# Patient Record
Sex: MALE | Race: BLACK OR AFRICAN AMERICAN | NOT HISPANIC OR LATINO | Employment: OTHER | URBAN - METROPOLITAN AREA
[De-identification: names, ages, dates, MRNs, and addresses within clinical notes are randomized per-mention and may not be internally consistent; named-entity substitution may affect disease eponyms.]

---

## 2019-06-13 ENCOUNTER — APPOINTMENT (OUTPATIENT)
Dept: GENERAL RADIOLOGY | Facility: HOSPITAL | Age: 64
End: 2019-06-13

## 2019-06-13 ENCOUNTER — HOSPITAL ENCOUNTER (EMERGENCY)
Facility: HOSPITAL | Age: 64
Discharge: HOME | End: 2019-06-13
Attending: EMERGENCY MEDICINE | Admitting: EMERGENCY MEDICINE

## 2019-06-13 VITALS
OXYGEN SATURATION: 99 % | WEIGHT: 245 LBS | RESPIRATION RATE: 17 BRPM | DIASTOLIC BLOOD PRESSURE: 89 MMHG | BODY MASS INDEX: 32.47 KG/M2 | SYSTOLIC BLOOD PRESSURE: 143 MMHG | HEART RATE: 73 BPM | HEIGHT: 73 IN | TEMPERATURE: 98.6 F

## 2019-06-13 DIAGNOSIS — M54.31 RIGHT SIDED SCIATICA: Primary | ICD-10-CM

## 2019-06-13 PROCEDURE — 99283 EMERGENCY DEPT VISIT LOW MDM: CPT

## 2019-06-13 PROCEDURE — 25010000002 HYDROMORPHONE 1 MG/ML SOLUTION: Performed by: EMERGENCY MEDICINE

## 2019-06-13 PROCEDURE — 72110 X-RAY EXAM L-2 SPINE 4/>VWS: CPT

## 2019-06-13 PROCEDURE — 96372 THER/PROPH/DIAG INJ SC/IM: CPT

## 2019-06-13 RX ORDER — ALLOPURINOL 300 MG/1
300 TABLET ORAL DAILY
COMMUNITY

## 2019-06-13 RX ORDER — METHYLPREDNISOLONE 4 MG/1
TABLET ORAL
Qty: 21 EACH | Refills: 0 | Status: SHIPPED | OUTPATIENT
Start: 2019-06-13

## 2019-06-13 RX ORDER — HYDROCODONE BITARTRATE AND ACETAMINOPHEN 5; 325 MG/1; MG/1
1 TABLET ORAL EVERY 6 HOURS PRN
Qty: 10 TABLET | Refills: 0 | Status: SHIPPED | OUTPATIENT
Start: 2019-06-13

## 2019-06-13 RX ORDER — TAMSULOSIN HYDROCHLORIDE 0.4 MG/1
1 CAPSULE ORAL NIGHTLY
COMMUNITY

## 2019-06-13 RX ORDER — ONDANSETRON 4 MG/1
4 TABLET, ORALLY DISINTEGRATING ORAL ONCE
Status: COMPLETED | OUTPATIENT
Start: 2019-06-13 | End: 2019-06-13

## 2019-06-13 RX ORDER — VALACYCLOVIR HYDROCHLORIDE 500 MG/1
500 TABLET, FILM COATED ORAL DAILY
COMMUNITY

## 2019-06-13 RX ORDER — AMLODIPINE BESYLATE 10 MG/1
10 TABLET ORAL DAILY
COMMUNITY

## 2019-06-13 RX ORDER — ATENOLOL 25 MG/1
25 TABLET ORAL DAILY
COMMUNITY

## 2019-06-13 RX ORDER — ASPIRIN 81 MG/1
81 TABLET, CHEWABLE ORAL DAILY
COMMUNITY

## 2019-06-13 RX ADMIN — HYDROMORPHONE HYDROCHLORIDE 1 MG: 1 INJECTION, SOLUTION INTRAMUSCULAR; INTRAVENOUS; SUBCUTANEOUS at 19:29

## 2019-06-13 RX ADMIN — ONDANSETRON 4 MG: 4 TABLET, ORALLY DISINTEGRATING ORAL at 19:29

## 2019-07-24 ENCOUNTER — TRANSCRIBE ORDERS (OUTPATIENT)
Dept: PHYSICAL THERAPY | Facility: CLINIC | Age: 64
End: 2019-07-24

## 2019-07-24 DIAGNOSIS — M54.40 LOW BACK PAIN WITH SCIATICA, SCIATICA LATERALITY UNSPECIFIED, UNSPECIFIED BACK PAIN LATERALITY, UNSPECIFIED CHRONICITY: Primary | ICD-10-CM

## 2019-07-31 ENCOUNTER — TREATMENT (OUTPATIENT)
Dept: PHYSICAL THERAPY | Facility: CLINIC | Age: 64
End: 2019-07-31

## 2019-07-31 DIAGNOSIS — M54.42 ACUTE BILATERAL LOW BACK PAIN WITH BILATERAL SCIATICA: Primary | ICD-10-CM

## 2019-07-31 DIAGNOSIS — M54.41 ACUTE BILATERAL LOW BACK PAIN WITH BILATERAL SCIATICA: Primary | ICD-10-CM

## 2019-07-31 PROCEDURE — 97110 THERAPEUTIC EXERCISES: CPT | Performed by: PHYSICAL THERAPIST

## 2019-07-31 PROCEDURE — 97161 PT EVAL LOW COMPLEX 20 MIN: CPT | Performed by: PHYSICAL THERAPIST

## 2019-07-31 NOTE — PROGRESS NOTES
Physical Therapy Initial Evaluation and Plan of Care    Patient: Kishore Rodriguez Jr.   : 1955  Diagnosis/ICD-10 Code:  Acute bilateral low back pain with bilateral sciatica [M54.42, M54.41]  Referring practitioner: Jessica Jones MD    Subjective Evaluation    History of Present Illness  Date of onset: 2019    Subjective comment: Pt reports onset of low back pain of unknown onset and had to go to ER on 19. Pt report pain radiating to B LE.  Patient Occupation: Retired  Quality of life: excellent    Pain  Current pain ratin  At best pain ratin  At worst pain rating: 10  Location: low back   Quality: radiating and sharp  Relieving factors: medications and change in position  Aggravating factors: standing and ambulation  Progression: improved    Social Support  Lives in: multiple-level home  Lives with: spouse    Hand dominance: right    Diagnostic Tests  X-ray: abnormal    Treatments  Previous treatment: medication  Current treatment: medication and physical therapy  Patient Goals  Patient goals for therapy: decreased pain, increased strength and return to sport/leisure activities  Patient goal: be able to resume walking 2.5 miles            Objective     Special Questions  Patient is experiencing night pain.       Static Posture     Lumbar Spine   Decreased lordosis.     Postural Observations  Seated posture: fair  Standing posture: fair  Correction of posture: has no consistent effect        Palpation   Left   Tenderness of the lumbar paraspinals.     Right Tenderness of the lumbar paraspinals.     Active Range of Motion     Lumbar   Flexion: Active lumbar flexion: 50% with pain  Left lateral flexion: Active left lumbar lateral flexion: 75% with pain  Right lateral flexion: Active right lumbar lateral flexion: 75% with pain  Left rotation: Active left lumbar rotation: 75%   Right rotation: Active right lumbar rotation: 75%     Additional Active Range of Motion Details  Patient had tight B  hamstrings: measure in 90/90 supine position: R: 55 degrees and L 60 degrees  B Tight hip IR and ER     Strength/Myotome Testing     Left Hip   Planes of Motion   Flexion: 4+  Abduction: 5  Adduction: 5  External rotation: 5  Internal rotation: 5    Right Hip   Planes of Motion   Flexion: 4+  Abduction: 5  Adduction: 5  External rotation: 5  Internal rotation: 5    Left Knee   Flexion: 5  Extension: 5    Right Knee   Flexion: 5  Extension: 5    Left Ankle/Foot   Dorsiflexion: 5    Right Ankle/Foot   Dorsiflexion: 5    Tests     Left Pelvic Girdle/Sacrum   Positive: sacrum compression.     Right Pelvic Girdle/Sacrum   Positive: sacrum compression.          Assessment & Plan     Assessment  Impairments: abnormal gait, abnormal or restricted ROM, activity intolerance, impaired physical strength, lacks appropriate home exercise program and pain with function  Other impairment: decreased hamstring and hip flexibility   Prognosis: good  Functional Limitations: lifting, walking, uncomfortable because of pain, sitting and standing  Goals  Plan Goals: SHORT TERM GOALS: 4 Visits:  1. Pt will be compliant with HEP  2. Pt will have increased B hamstring flexibility by 5 degrees.  3. Pt will be able to walk .5 miles without increase in pain    LONG TERM GOALS: 16 visits:  1. Pt will have increased B hamstring  flexibility by 15 degrees   2. Pt will tolerate walking 2.5 miles without increase in pain   3. Pt will have decreased back index score to 21-40.     Plan  Therapy options: will be seen for skilled physical therapy services  Planned modality interventions: cryotherapy, electrical stimulation/Russian stimulation, high voltage pulsed current (pain management) and thermotherapy (hydrocollator packs)  Planned therapy interventions: abdominal trunk stabilization, body mechanics training, flexibility, functional ROM exercises, gait training, home exercise program, joint mobilization, manual therapy, neuromuscular re-education,  soft tissue mobilization, spinal/joint mobilization, strengthening and stretching  Duration in visits: 16  Treatment plan discussed with: patient        Manual Therapy:          mins  49541;  Therapeutic Exercise:     10     mins  49142;     Neuromuscular Elton:         mins  43451;    Therapeutic Activity:           mins  68977;     Gait Training:            mins  25501;     Ultrasound:           mins  05735;    Electrical Stimulation:          mins  67475 ( );  Dry Needling           mins self-pay  Traction           mins 06798  Canalith Repositioning         mins 56277      Timed Treatment:   10   mins   Total Treatment:     50  mins    PT SIGNATURE: Roseanna Islas PT   [place] Lic #[id number]    DATE TREATMENT INITIATED: 7/31/2019    Initial Certification  Certification Period: 10/29/2019  I certify that the therapy services are furnished while this patient is under my care.  The services outlined above are required by this patient, and will be reviewed every 90 days.     PHYSICIAN: Jessica Jones MD      DATE:     Please sign and return via fax to [phone].. Thank you, [facility].

## 2019-07-31 NOTE — PATIENT INSTRUCTIONS
Access Code: [email or web address]   URL: [email or web address]   Date: 07/31/2019   Prepared by: Roseanna Islas     Exercises   Prone Press Up on Elbows - 10 reps - 3 sets - 30 hold - 1x daily - 7x weekly   Seated Piriformis Stretch - 10 reps - 3 sets - 30 hold - 1x daily - 7x weekly   Supine Figure 4 Piriformis Stretch - 10 reps - 3 sets - 30 hold - 1x daily - 7x weekly   Seated Gastroc Stretch with Strap - 10 reps - 3 sets - 1x daily - 7x weekly

## 2019-08-02 ENCOUNTER — TREATMENT (OUTPATIENT)
Dept: PHYSICAL THERAPY | Facility: CLINIC | Age: 64
End: 2019-08-02

## 2019-08-02 DIAGNOSIS — M54.42 ACUTE BILATERAL LOW BACK PAIN WITH BILATERAL SCIATICA: Primary | ICD-10-CM

## 2019-08-02 DIAGNOSIS — M54.41 ACUTE BILATERAL LOW BACK PAIN WITH BILATERAL SCIATICA: Primary | ICD-10-CM

## 2019-08-02 PROCEDURE — 97110 THERAPEUTIC EXERCISES: CPT | Performed by: PHYSICAL THERAPIST

## 2019-08-02 NOTE — PROGRESS NOTES
Physical Therapy Daily Progress Note  Visit: 2    Kishore Rodriguez reports: compliance with HEP and reports pain at 3-4/10     Subjective     Objective   See Exercise, Manual, and Modality Logs for complete treatment.       Assessment/Plan: decreased B hamstring and hip IR/ER flexibility./continue to progress as tolerated.     Manual Therapy:     4     mins  70644;  Therapeutic Exercise:     40     mins  59241;     Neuromuscular Elton:       mins  89648;    Therapeutic Activity:           mins  82710;     Gait Training:            mins  03302;     Ultrasound:           mins  29021;    Electrical Stimulation:          mins  77875 ( );  Dry Needling           mins self-pay  Traction           mins 95147  Canalith Repositioning         mins 38086      Timed Treatment:   44  mins   Total Treatment:    60    mins    Roseanna Islas, PT  [place] License #: [id number]    Physical Therapist

## 2019-08-07 ENCOUNTER — TREATMENT (OUTPATIENT)
Dept: PHYSICAL THERAPY | Facility: CLINIC | Age: 64
End: 2019-08-07

## 2019-08-07 DIAGNOSIS — M54.41 ACUTE BILATERAL LOW BACK PAIN WITH BILATERAL SCIATICA: Primary | ICD-10-CM

## 2019-08-07 DIAGNOSIS — M54.42 ACUTE BILATERAL LOW BACK PAIN WITH BILATERAL SCIATICA: Primary | ICD-10-CM

## 2019-08-07 PROCEDURE — 97110 THERAPEUTIC EXERCISES: CPT | Performed by: PHYSICAL THERAPIST

## 2019-08-07 PROCEDURE — 97140 MANUAL THERAPY 1/> REGIONS: CPT | Performed by: PHYSICAL THERAPIST

## 2019-08-07 NOTE — PROGRESS NOTES
Physical Therapy Daily Progress Note  Visit: 3    Kishore Rodriguez reports: compliance with HEP and that his back has been feeling better but aggravated by mowing his grass    Subjective     Objective   See Exercise, Manual, and Modality Logs for complete treatment.       Assessment/Plan B tight hip IR/ER and hamstrings and spinal rotation/ continue to progress as tolerated    Manual Therapy:    8     mins  74253;  Therapeutic Exercise:     45      mins  41249;     Neuromuscular Elton:        mins  78559;    Therapeutic Activity:        mins  89203;     Gait Training:            mins  21665;     Ultrasound:           mins  67978;    Electrical Stimulation:          mins  64855 ( );  Dry Needling           mins self-pay  Traction           mins 86102  Canalith Repositioning         mins 79216      Timed Treatment:  53    mins   Total Treatment:   65     mins    Roseanna Islas, PT  [place] License #: [id number]    Physical Therapist

## 2019-08-09 ENCOUNTER — TREATMENT (OUTPATIENT)
Dept: PHYSICAL THERAPY | Facility: CLINIC | Age: 64
End: 2019-08-09

## 2019-08-09 DIAGNOSIS — M54.42 ACUTE BILATERAL LOW BACK PAIN WITH BILATERAL SCIATICA: Primary | ICD-10-CM

## 2019-08-09 DIAGNOSIS — M54.41 ACUTE BILATERAL LOW BACK PAIN WITH BILATERAL SCIATICA: Primary | ICD-10-CM

## 2019-08-09 PROCEDURE — 97110 THERAPEUTIC EXERCISES: CPT | Performed by: PHYSICAL THERAPIST

## 2019-08-09 NOTE — PROGRESS NOTES
Physical Therapy Daily Progress Note  Visit: 4    Kishore Rodriguez reports: minimal pain today, just tightness. Reports compliance with HEP     Subjective     Objective   See Exercise, Manual, and Modality Logs for complete treatment.       Assessment/Plan: Progressing as evidenced by decreased c/o pain and improved tolerance to stretching/ progress as tolerated     Manual Therapy:          mins  69650;  Therapeutic Exercise:    55      mins  40259;     Neuromuscular Elton:         mins  81984;    Therapeutic Activity:           mins  38614;     Gait Training:            mins  19912;     Ultrasound:           mins  90257;    Electrical Stimulation:          mins  31020 ( );  Dry Needling           mins self-pay  Traction           mins 18204  Canalith Repositioning         mins 33762      Timed Treatment: 55     mins   Total Treatment:   70     mins    Roseanna Islas, PT  [place] License #: [id number]    Physical Therapist

## 2019-08-16 ENCOUNTER — TREATMENT (OUTPATIENT)
Dept: PHYSICAL THERAPY | Facility: CLINIC | Age: 64
End: 2019-08-16

## 2019-08-16 DIAGNOSIS — M54.42 ACUTE BILATERAL LOW BACK PAIN WITH BILATERAL SCIATICA: Primary | ICD-10-CM

## 2019-08-16 DIAGNOSIS — M54.41 ACUTE BILATERAL LOW BACK PAIN WITH BILATERAL SCIATICA: Primary | ICD-10-CM

## 2019-08-16 PROCEDURE — 97110 THERAPEUTIC EXERCISES: CPT | Performed by: PHYSICAL THERAPIST

## 2019-08-16 NOTE — PATIENT INSTRUCTIONS
Added to HEP and educated pt on exercises     Access Code: [email or web address]   URL: [email or web address]   Date: 08/16/2019   Prepared by: Roseanna Islas     Exercises   Bridge with Hip Abduction and Resistance - 10 reps - 3 sets - 1x daily - 7x weekly   Clamshell with Resistance - 10 reps - 3 sets - 1x daily - 7x weekly

## 2019-08-16 NOTE — PROGRESS NOTES
Physical Therapy Daily Progress Note  Visit: 5    Kishore Rodriguez reports: stiffness in back in the morning and pain at 4/10. Reports stiffness in back after prolonged sitting. Reports compliance with HEP and reports he has started walking again. reports he feels like  he is making progress due to better tolerance to activity and decrease in pain     Subjective     Objective   See Exercise, Manual, and Modality Logs for complete treatment. Provided instruction in exercises and proper technique and purpose of exercises.       Assessment/Plan Pt has better tolerance to exercises and progressed exercises. / continue to progress strength, ROM and flexibility as tolerated.     Manual Therapy:          mins  31806;  Therapeutic Exercise:    55      mins  17677;     Neuromuscular Elton:         mins  25156;    Therapeutic Activity:           mins  60722;     Gait Training:            mins  98466;     Ultrasound:           mins  94081;    Electrical Stimulation:          mins  98993 ( );  Dry Needling           mins self-pay  Traction           mins 27996  Canalith Repositioning         mins 81122      Timed Treatment:  55    mins   Total Treatment:    55    mins    Roseanna Islas PT  [place] License #: [id number]    Physical Therapist

## 2019-08-21 ENCOUNTER — TREATMENT (OUTPATIENT)
Dept: PHYSICAL THERAPY | Facility: CLINIC | Age: 64
End: 2019-08-21

## 2019-08-21 DIAGNOSIS — M54.41 ACUTE BILATERAL LOW BACK PAIN WITH BILATERAL SCIATICA: Primary | ICD-10-CM

## 2019-08-21 DIAGNOSIS — M54.42 ACUTE BILATERAL LOW BACK PAIN WITH BILATERAL SCIATICA: Primary | ICD-10-CM

## 2019-08-21 PROCEDURE — 97110 THERAPEUTIC EXERCISES: CPT | Performed by: PHYSICAL THERAPIST

## 2019-08-21 NOTE — PROGRESS NOTES
Physical Therapy Daily Progress Note  Visit: 6    Kishore Rodriguez reports: pain at 7/10 today due to he was staining his deck and aggravated his back. Reports overall doing better and less pain until yesterday when he stained his deck     Subjective     Objective   See Exercise, Manual, and Modality Logs for complete treatment. Provided instruction in exercises and proper technique and purpose of exercises.         Assessment/Plan increased c/o pain today due to staining deck. Will expect pt to have resolution of increased symptoms due to good progress thus far. Compliant/cooperative with current rehab efforts.  Benefits from verbal/tactile cues to ensure correct exercise performance/technique, hold time and position. /Plan details: Progress ROM / strengthening / stabilization / functional activity as tolerated     Manual Therapy:          mins  05837;  Therapeutic Exercise:    55      mins  99827;     Neuromuscular Elton:         mins  49684;    Therapeutic Activity:           mins  19157;     Gait Training:            mins  30757;     Ultrasound:           mins  56564;    Electrical Stimulation:          mins  01947 ( );  Dry Needling           mins self-pay  Traction           mins 55315  Canalith Repositioning         mins 81022      Timed Treatment:  55    mins   Total Treatment:   55     mins    SEJAL Melvin License #: [id number]    Physical Therapist

## 2019-08-23 ENCOUNTER — TREATMENT (OUTPATIENT)
Dept: PHYSICAL THERAPY | Facility: CLINIC | Age: 64
End: 2019-08-23

## 2019-08-23 PROCEDURE — 97110 THERAPEUTIC EXERCISES: CPT | Performed by: PHYSICAL THERAPIST

## 2019-08-23 NOTE — PROGRESS NOTES
Physical Therapy Daily Progress Note  Visit: 7    Kishore Rodriguez reports: his L hamstring is sore today. Reports his back is feeling better and no pain    Subjective     Objective   See Exercise, Manual, and Modality Logs for complete treatment. Provided instruction in exercises and proper technique and purpose of exercises.         Assessment/Plan Compliant/cooperative with current rehab efforts.  Benefits from verbal/tactile cues to ensure correct exercise performance/technique, hold time and position. / Plan details: Progress ROM / strengthening / stabilization / functional activity as tolerated     Manual Therapy:          mins  34681;  Therapeutic Exercise:    40      mins  85077;     Neuromuscular Elton:         mins  79728;    Therapeutic Activity:           mins  07698;     Gait Training:            mins  24329;     Ultrasound:           mins  14108;    Electrical Stimulation:          mins  82881 ( );  Dry Needling           mins self-pay  Traction           mins 12257  Canalith Repositioning         mins 52752      Timed Treatment:  40   mins   Total Treatment:    40    mins    SEJAL Melvin License #: [id number]    Physical Therapist

## 2019-08-28 ENCOUNTER — TREATMENT (OUTPATIENT)
Dept: PHYSICAL THERAPY | Facility: CLINIC | Age: 64
End: 2019-08-28

## 2019-08-28 DIAGNOSIS — M54.42 ACUTE BILATERAL LOW BACK PAIN WITH BILATERAL SCIATICA: Primary | ICD-10-CM

## 2019-08-28 DIAGNOSIS — M54.41 ACUTE BILATERAL LOW BACK PAIN WITH BILATERAL SCIATICA: Primary | ICD-10-CM

## 2019-08-28 PROCEDURE — 97110 THERAPEUTIC EXERCISES: CPT | Performed by: PHYSICAL THERAPIST

## 2019-08-28 NOTE — PROGRESS NOTES
Physical Therapy Daily Progress Note  Visit: 8    Kishore Rodriguez reports: no pain today and compliance with HEP    Subjective     Objective   See Exercise, Manual, and Modality Logs for complete treatment. Provided instruction in exercises and proper technique and purpose of exercises.         Assessment/Plan Compliant/cooperative with current rehab efforts.  Benefits from verbal/tactile cues to ensure correct exercise performance/technique, hold time and position. / Plan details: Progress ROM / strengthening / stabilization / functional activity as tolerated     Manual Therapy:          mins  14037;  Therapeutic Exercise:     40     mins  67421;     Neuromuscular Elton:         mins  27638;    Therapeutic Activity:           mins  36028;     Gait Training:            mins  56783;     Ultrasound:           mins  37931;    Electrical Stimulation:          mins  33822 ( );  Dry Needling           mins self-pay  Traction           mins 49126  Canalith Repositioning         mins 12545      Timed Treatment:  40    mins   Total Treatment:   40     mins    Roseanna Islas PT  [place] License #: [id number]    Physical Therapist

## 2019-08-30 ENCOUNTER — TREATMENT (OUTPATIENT)
Dept: PHYSICAL THERAPY | Facility: CLINIC | Age: 64
End: 2019-08-30

## 2019-08-30 PROCEDURE — 97110 THERAPEUTIC EXERCISES: CPT | Performed by: PHYSICAL THERAPIST

## 2019-08-30 NOTE — PROGRESS NOTES
Physical Therapy Daily Progress Note  Visit: 9    Kishore Rodriguez reports: feeling some tightness today but no pain.     Subjective     Objective   See Exercise, Manual, and Modality Logs for complete treatment. Provided instruction in exercises and proper technique and purpose of exercises.         Assessment/Plan Compliant/cooperative with current rehab efforts.  Benefits from verbal/tactile cues to ensure correct exercise performance/technique, hold time and position. / Plan details: Progress ROM / strengthening / stabilization / functional activity as tolerated     Manual Therapy:          mins  18587;  Therapeutic Exercise:     55     mins  07436;     Neuromuscular Elton:         mins  37756;    Therapeutic Activity:           mins  40423;     Gait Training:            mins  24301;     Ultrasound:           mins  64253;    Electrical Stimulation:          mins  74927 ( );  Dry Needling           mins self-pay  Traction           mins 35414  Canalith Repositioning         mins 18348      Timed Treatment: 55     mins   Total Treatment:    55    mins    SEJAL Melvin License #: [id number]    Physical Therapist

## 2019-09-04 ENCOUNTER — OFFICE VISIT (OUTPATIENT)
Dept: PHYSICAL THERAPY | Facility: CLINIC | Age: 64
End: 2019-09-04

## 2019-09-04 DIAGNOSIS — M54.41 ACUTE BILATERAL LOW BACK PAIN WITH BILATERAL SCIATICA: Primary | ICD-10-CM

## 2019-09-04 DIAGNOSIS — M54.42 ACUTE BILATERAL LOW BACK PAIN WITH BILATERAL SCIATICA: Primary | ICD-10-CM

## 2019-09-04 PROCEDURE — 97110 THERAPEUTIC EXERCISES: CPT | Performed by: PHYSICAL THERAPIST

## 2019-09-04 NOTE — PROGRESS NOTES
Physical Therapy Daily Progress Note/ reassessment   Visit: 10    Kishore Rodriguez reports: soreness in back due to standing a lot over the weekend    Subjective     Objective   See Exercise, Manual, and Modality Logs for complete treatment. Provided instruction in exercises and proper technique and purpose of exercises.      Goals  Plan Goals: SHORT TERM GOALS: 4 Visits:  1. Pt will be compliant with HEP progressing as evolving HEP  2. Pt will have increased B hamstring flexibility by 5 degrees. Met   3. Pt will be able to walk .5 miles without increase in pain progressing     LONG TERM GOALS: 16 visits:  1. Pt will have increased B hamstring  flexibility by 15 degrees not met   2. Pt will tolerate walking 2.5 miles without increase in pain not  Met   3. Pt will have decreased back index score to 21-40. Not tested this date          Assessment/Plan Compliant/cooperative with current rehab efforts.  Benefits from verbal/tactile cues to ensure correct exercise performance/technique, hold time and position. / Plan details: Progress ROM / strengthening / stabilization / functional activity as tolerated     Manual Therapy:          mins  72819;  Therapeutic Exercise:      40    mins  89651;     Neuromuscular Elton:         mins  62966;    Therapeutic Activity:           mins  61053;     Gait Training:            mins  89911;     Ultrasound:           mins  25108;    Electrical Stimulation:          mins  09358 ( );  Dry Needling           mins self-pay  Traction           mins 63919  Canalith Repositioning         mins 02508      Timed Treatment: 40     mins   Total Treatment:    40    mins    Roseanna Islas PT  [place] License #: [id number]    Physical Therapist

## 2019-09-06 ENCOUNTER — OFFICE VISIT (OUTPATIENT)
Dept: PHYSICAL THERAPY | Facility: CLINIC | Age: 64
End: 2019-09-06

## 2019-09-06 DIAGNOSIS — M54.41 ACUTE BILATERAL LOW BACK PAIN WITH BILATERAL SCIATICA: Primary | ICD-10-CM

## 2019-09-06 DIAGNOSIS — M54.42 ACUTE BILATERAL LOW BACK PAIN WITH BILATERAL SCIATICA: Primary | ICD-10-CM

## 2019-09-06 PROCEDURE — 97110 THERAPEUTIC EXERCISES: CPT | Performed by: PHYSICAL THERAPIST

## 2019-09-06 NOTE — PROGRESS NOTES
Physical Therapy Daily Progress Note  Visit: 11    Kishore Rodriguez reports:overall his back is feeling much better. Reports compliance with HEP    Subjective     Objective   See Exercise, Manual, and Modality Logs for complete treatment.  Pt scored 28% perceived disabilty on back index. (improved from 70% at initial evaluation). B hamstring measurements: R 30 degrees; L 25 degrees (measured in supine 90/90 position).       Assessment/Plan Pt has progressed well as evidenced by improved rating on back index scale and improved B hamstring flexibility. Progressing toward all goals.  Compliant/cooperative with current rehab efforts.  Benefits from verbal/tactile cues to ensure correct exercise performance/technique, hold time and position. / Plan details: Progress ROM / strengthening / stabilization / functional activity as tolerated     Manual Therapy:          mins  28562;  Therapeutic Exercise:     55     mins  62121;     Neuromuscular Elton:         mins  86961;    Therapeutic Activity:           mins  53270;     Gait Training:            mins  58985;     Ultrasound:           mins  60579;    Electrical Stimulation:          mins  58856 ( );  Dry Needling           mins self-pay  Traction           mins 20727  Canalith Repositioning         mins 29872      Timed Treatment: 55     mins   Total Treatment:   55     mins    Roseanna Islas PT  [place] License #: [id number]    Physical Therapist

## 2019-09-11 ENCOUNTER — OFFICE VISIT (OUTPATIENT)
Dept: PHYSICAL THERAPY | Facility: CLINIC | Age: 64
End: 2019-09-11

## 2019-09-11 DIAGNOSIS — M54.42 ACUTE BILATERAL LOW BACK PAIN WITH BILATERAL SCIATICA: Primary | ICD-10-CM

## 2019-09-11 DIAGNOSIS — M54.41 ACUTE BILATERAL LOW BACK PAIN WITH BILATERAL SCIATICA: Primary | ICD-10-CM

## 2019-09-11 PROCEDURE — 97110 THERAPEUTIC EXERCISES: CPT | Performed by: PHYSICAL THERAPIST

## 2019-09-11 NOTE — PROGRESS NOTES
Physical Therapy Daily Progress Note  Visit: 12    Kishore Rodriguez reports: no back pain today.     Subjective     Objective   See Exercise, Manual, and Modality Logs for complete treatment. Progressed resistance with exercises and added leg strengthening exercise for hip and quadricep strengthening       Assessment/Plan Compliant/cooperative with current rehab efforts.  Benefits from verbal/tactile cues to ensure correct exercise performance/technique, hold time and position. / Plan details: Progress ROM / strengthening / stabilization / functional activity as tolerated     Manual Therapy:          mins  21465;  Therapeutic Exercise:       60   mins  03991;     Neuromuscular Elton:         mins  84774;    Therapeutic Activity:           mins  76308;     Gait Training:            mins  75217;     Ultrasound:           mins  38716;    Electrical Stimulation:          mins  29691 ( );  Dry Needling           mins self-pay  Traction           mins 48170  Canalith Repositioning         mins 49107      Timed Treatment: 60     mins   Total Treatment:    60    mins    Roseanna Islas PT  [place] License #: [id number]    Physical Therapist

## 2019-09-13 ENCOUNTER — OFFICE VISIT (OUTPATIENT)
Dept: PHYSICAL THERAPY | Facility: CLINIC | Age: 64
End: 2019-09-13

## 2019-09-13 DIAGNOSIS — M54.42 ACUTE BILATERAL LOW BACK PAIN WITH BILATERAL SCIATICA: Primary | ICD-10-CM

## 2019-09-13 DIAGNOSIS — M54.41 ACUTE BILATERAL LOW BACK PAIN WITH BILATERAL SCIATICA: Primary | ICD-10-CM

## 2019-09-13 PROCEDURE — 97110 THERAPEUTIC EXERCISES: CPT | Performed by: PHYSICAL THERAPIST

## 2019-09-13 NOTE — PROGRESS NOTES
Physical Therapy Daily Progress Note  Visit: 13    Kishore Rodriguez reports: compliance with HEP. Reports he has started back to walking for exercise.    Subjective     Objective   See Exercise, Manual, and Modality Logs for complete treatment. Provided instruction in exercises and proper technique and purpose of exercises.         Assessment/Plan Progressing well as evidenced by decreased c/o back pain and improved hamstring flexibility that pt is now able start returning to walking for exercise, which he had stopped doing due to back pain.  Compliant/cooperative with current rehab efforts.  Benefits from verbal/tactile cues to ensure correct exercise performance/technique, hold time and position.     Manual Therapy:          mins  12437;  Therapeutic Exercise:      53    mins  38403;     Neuromuscular Elton:         mins  01871;    Therapeutic Activity:           mins  80488;     Gait Training:            mins  69310;     Ultrasound:           mins  23675;    Electrical Stimulation:          mins  20380 ( );  Dry Needling           mins self-pay  Traction           mins 54564  Canalith Repositioning         mins 59615      Timed Treatment:  53    mins   Total Treatment:     53   mins    SEJAL Melvin License #: [id number]    Physical Therapist

## 2019-09-18 ENCOUNTER — TREATMENT (OUTPATIENT)
Dept: PHYSICAL THERAPY | Facility: CLINIC | Age: 64
End: 2019-09-18

## 2019-09-18 DIAGNOSIS — M54.41 ACUTE BILATERAL LOW BACK PAIN WITH BILATERAL SCIATICA: Primary | ICD-10-CM

## 2019-09-18 DIAGNOSIS — M54.42 ACUTE BILATERAL LOW BACK PAIN WITH BILATERAL SCIATICA: Primary | ICD-10-CM

## 2019-09-18 PROCEDURE — 97110 THERAPEUTIC EXERCISES: CPT | Performed by: PHYSICAL THERAPIST

## 2019-09-18 NOTE — PROGRESS NOTES
Physical Therapy Daily Progress Note  Visit: 14    Kishore Rodriguez reports: his back feels better. Reports he has started back walking but only has once since it's been too hot outside.   Subjective     Objective   See Exercise, Manual, and Modality Logs for complete treatment. Provided instruction in exercises and proper technique and purpose of exercises.         Assessment/Plan Compliant/cooperative with current rehab efforts.  Benefits from verbal/tactile cues to ensure correct exercise performance/technique, hold time and position. / Plan details: Progress ROM / strengthening / stabilization / functional activity as tolerated     Manual Therapy:          mins  27560;  Therapeutic Exercise:      55    mins  68674;     Neuromuscular Elton:         mins  68649;    Therapeutic Activity:           mins  07911;     Gait Training:         mins  43574;     Ultrasound:           mins  70814;    Electrical Stimulation:          mins  37937 ( );  Dry Needling           mins self-pay  Traction           mins 72526  Canalith Repositioning         mins 72491      Timed Treatment:   55   mins   Total Treatment:     55   mins    Roseanna Islas PT  [place] License #: [id number]    Physical Therapist

## 2019-09-20 ENCOUNTER — TREATMENT (OUTPATIENT)
Dept: PHYSICAL THERAPY | Facility: CLINIC | Age: 64
End: 2019-09-20

## 2019-09-20 DIAGNOSIS — M54.42 ACUTE BILATERAL LOW BACK PAIN WITH BILATERAL SCIATICA: Primary | ICD-10-CM

## 2019-09-20 DIAGNOSIS — M54.41 ACUTE BILATERAL LOW BACK PAIN WITH BILATERAL SCIATICA: Primary | ICD-10-CM

## 2019-09-20 PROCEDURE — 97110 THERAPEUTIC EXERCISES: CPT | Performed by: PHYSICAL THERAPIST

## 2019-09-20 NOTE — PROGRESS NOTES
Physical Therapy Daily Progress Note  Visit: 15    Kishore Rodriguez reports: his back continues to feel better    Subjective     Objective   See Exercise, Manual, and Modality Logs for complete treatment. Provided instruction in exercises and proper technique and purpose of exercises.         Assessment/Plan Compliant/cooperative with current rehab efforts.  Benefits from verbal/tactile cues to ensure correct exercise performance/technique, hold time and position. / Plan details: Progress ROM / strengthening / stabilization / functional activity as tolerated     Manual Therapy:          mins  63117;  Therapeutic Exercise:        55  mins  92177;     Neuromuscular Elton:         mins  31989;    Therapeutic Activity:           mins  62425;     Gait Training:            mins  54365;     Ultrasound:           mins  39048;    Electrical Stimulation:          mins  44510 ( );  Dry Needling           mins self-pay  Traction           mins 66973  Canalith Repositioning         mins 72272      Timed Treatment:  55    mins   Total Treatment:    55    mins    Roseanna Islas PT  [place] License #: [id number]    Physical Therapist

## 2019-09-27 ENCOUNTER — TREATMENT (OUTPATIENT)
Dept: PHYSICAL THERAPY | Facility: CLINIC | Age: 64
End: 2019-09-27

## 2019-09-27 DIAGNOSIS — M54.41 ACUTE BILATERAL LOW BACK PAIN WITH BILATERAL SCIATICA: Primary | ICD-10-CM

## 2019-09-27 DIAGNOSIS — M54.42 ACUTE BILATERAL LOW BACK PAIN WITH BILATERAL SCIATICA: Primary | ICD-10-CM

## 2019-09-27 PROCEDURE — 97110 THERAPEUTIC EXERCISES: CPT | Performed by: PHYSICAL THERAPIST

## 2019-09-27 NOTE — PROGRESS NOTES
Physical Therapy Daily Progress Note/Discharge Note   Initial Visit: 7/31/19   Visit: 16    Kishore Rodriguez reports: no aipn in his back and doing much better. Reports he has started his walking regimen again witout any issues      Subjective     Objective   See Exercise, Manual, and Modality Logs for complete treatment.  B hamstring flexibility: 40 degrees   Plan Goals: SHORT TERM GOALS: 4 Visits:  1. Pt will be compliant with HEP MET  2. Pt will have increased B hamstring flexibility by 5 degrees. MET  3. Pt will be able to walk .5 miles without increase in pain MET    LONG TERM GOALS: 16 visits:  1. Pt will have increased B hamstring  flexibility by 15 degrees MET   2. Pt will tolerate walking 2.5 miles without increase in pain MET  3. Pt will have decreased back index score to 21-40. MET      Assessment/Plan PT has progressed well and met goals. DC PT and continue HEP     Manual Therapy:          mins  59135;  Therapeutic Exercise:     60     mins  39733;     Neuromuscular [place]:         mins  92475;    Therapeutic Activity:           mins  18575;     Gait Training:            mins  90095;     Ultrasound:           mins  61895;    Electrical Stimulation:          mins  30065 ( );  Dry Needling           mins self-pay  Traction           mins 58528  Canalith Repositioning         mins [id number]      Timed Treatment: 60     mins   Total Treatment:     60   mins    Roseanna Islas PT  [place] License #: [id number]    Physical Therapist

## 2020-03-23 ENCOUNTER — DOCUMENTATION (OUTPATIENT)
Dept: PHYSICAL THERAPY | Facility: CLINIC | Age: 65
End: 2020-03-23

## 2020-03-23 NOTE — PROGRESS NOTES
Discharge Summary  Discharge Summary from Physical Therapy Report      Dates  PT visit: 7/31/19  Number of Visits: 16    Discharge Status of Patient: See MD Note dated 9/27/19    Goals: All Met    Discharge Plan: Continue with current home exercise program as instructed    Comments     Date of Discharge 9/27/19        Roseanna Islas, PT  Physical Therapist

## 2025-04-12 ENCOUNTER — HOSPITAL ENCOUNTER (OUTPATIENT)
Facility: HOSPITAL | Age: 70
Discharge: HOME | End: 2025-04-12
Attending: EMERGENCY MEDICINE | Admitting: EMERGENCY MEDICINE
Payer: MEDICARE

## 2025-04-12 VITALS
OXYGEN SATURATION: 97 % | DIASTOLIC BLOOD PRESSURE: 80 MMHG | SYSTOLIC BLOOD PRESSURE: 148 MMHG | RESPIRATION RATE: 18 BRPM | TEMPERATURE: 97.6 F | HEART RATE: 76 BPM

## 2025-04-12 DIAGNOSIS — M79.674 PAIN OF TOE OF RIGHT FOOT: Primary | ICD-10-CM

## 2025-04-12 PROCEDURE — 99202 OFFICE O/P NEW SF 15 MIN: CPT | Performed by: PHYSICIAN ASSISTANT

## 2025-04-12 PROCEDURE — G0463 HOSPITAL OUTPT CLINIC VISIT: HCPCS | Performed by: PHYSICIAN ASSISTANT

## 2025-04-12 RX ORDER — PREDNISONE 20 MG/1
20 TABLET ORAL 2 TIMES DAILY
Qty: 10 TABLET | Refills: 0 | Status: SHIPPED | OUTPATIENT
Start: 2025-04-12 | End: 2025-04-17

## 2025-04-12 NOTE — FSED PROVIDER NOTE
Subjective   History of Present Illness    Patient reports that he had his right ingrown toenail [facility] by [facility] about 3 months ago.  He then developed generalized right great toe pain about 10 days ago.  [facility] and [facility] him colchicine.  He reports that the colchicine did alleviate his pain but returned when he completed the colchicine prescription.  He rates his pain an 8 out of 10 pain scale.    Review of Systems   Musculoskeletal:  Positive for arthralgias.       Past Medical History:   Diagnosis Date    Arthritis     Gout     Hypertension     Injury of neck     Sciatica        No Known Allergies    Past Surgical History:   Procedure Laterality Date    APPENDECTOMY      CATARACT EXTRACTION      NECK SURGERY         No family history on file.    Social History     Socioeconomic History    Marital status:    Tobacco Use    Smoking status: Former    Smokeless tobacco: Never   Substance and Sexual Activity    Alcohol use: No    Drug use: No           Objective   Physical Exam  Vitals and nursing note reviewed.   Constitutional:       Appearance: Normal appearance. He is normal weight.   HENT:      Head: Normocephalic and atraumatic.      Nose: Nose normal.      Mouth/Throat:      Mouth: Mucous membranes are moist.   Eyes:      Pupils: Pupils are equal, round, and reactive to light.   Cardiovascular:      Rate and Rhythm: Normal rate and regular rhythm.      Pulses: Normal pulses.      Heart sounds: Normal heart sounds.   Pulmonary:      Effort: Pulmonary effort is normal.      Breath sounds: Normal breath sounds.   Musculoskeletal:         General: Normal range of motion.      Cervical back: Normal range of motion.      Right lower leg: No edema.      Left lower leg: No edema.      Comments: Right great toe: no obvious swelling, minimal tenderness to palpation   Skin:     General: Skin is warm.   Neurological:      General: No focal deficit present.       Mental Status: He is alert.   Psychiatric:         Behavior: Behavior is cooperative.         Procedures           ED Course                                           Medical Decision Making  Problems Addressed:  Pain of toe of right foot: complicated acute illness or injury    Risk  Prescription drug management.        Final diagnoses:   Pain of toe of right foot       ED Disposition  ED Disposition       ED Disposition   Discharge    Condition   Stable    Comment   --               Rogelio Majano DPM  [address] [facility] [address]  [place] [id number] [phone]    Call            Medication List        New Prescriptions      predniSONE 20 MG tablet  Commonly known as: DELTASONE  Take 1 tablet by mouth 2 (Two) Times a Day for 5 days.               Where to Get Your Medications        These medications were sent to [address] - [address] - [phone]  - [phone]   [address] [place] [id number]      Phone: [phone]   predniSONE 20 MG tablet

## 2025-04-12 NOTE — DISCHARGE INSTRUCTIONS
I have referred you to podiatry.  Call their office Monday morning to schedule a close follow-up appointment.  Please take the prednisone as prescribed.  
- - -